# Patient Record
Sex: MALE | Race: WHITE | NOT HISPANIC OR LATINO | URBAN - NONMETROPOLITAN AREA
[De-identification: names, ages, dates, MRNs, and addresses within clinical notes are randomized per-mention and may not be internally consistent; named-entity substitution may affect disease eponyms.]

---

## 2021-07-29 ENCOUNTER — IMPORTED ENCOUNTER (OUTPATIENT)
Dept: URBAN - NONMETROPOLITAN AREA CLINIC 1 | Facility: CLINIC | Age: 56
End: 2021-07-29

## 2021-07-29 PROBLEM — H52.4: Noted: 2021-07-29

## 2021-07-29 PROBLEM — H25.813: Noted: 2021-07-29

## 2021-07-29 PROBLEM — H52.03: Noted: 2021-07-29

## 2021-07-29 PROCEDURE — 92004 COMPRE OPH EXAM NEW PT 1/>: CPT

## 2021-07-29 PROCEDURE — 92015 DETERMINE REFRACTIVE STATE: CPT

## 2021-07-29 NOTE — PATIENT DISCUSSION
Cataract OU-Not yet surgical. -Reviewed symptoms of advancing cataract growth such as glare and halos and decreased vision.-Continue to monitor for now. Pt will notify us if any new symptoms develop. Hyperopia-Discussed diagnosis with patient. Updated spec Rx given. Recommend lens that will provide comfort as well as protect safety and health of eyes.

## 2022-04-10 ASSESSMENT — VISUAL ACUITY
OS_SC: 20/25-1
OS_CC: 20/20
OD_CC: 20/20
OD_SC: 20/20-2

## 2022-04-10 ASSESSMENT — TONOMETRY
OS_IOP_MMHG: 14
OD_IOP_MMHG: 13